# Patient Record
Sex: FEMALE | URBAN - NONMETROPOLITAN AREA
[De-identification: names, ages, dates, MRNs, and addresses within clinical notes are randomized per-mention and may not be internally consistent; named-entity substitution may affect disease eponyms.]

---

## 2023-10-06 ENCOUNTER — DOCTOR'S OFFICE (OUTPATIENT)
Dept: URBAN - NONMETROPOLITAN AREA CLINIC 1 | Facility: CLINIC | Age: 88
Setting detail: OPHTHALMOLOGY
End: 2023-10-06
Payer: COMMERCIAL

## 2023-10-06 ENCOUNTER — RX ONLY (RX ONLY)
Age: 88
End: 2023-10-06

## 2023-10-06 DIAGNOSIS — H35.3132: ICD-10-CM

## 2023-10-06 DIAGNOSIS — H53.16: ICD-10-CM

## 2023-10-06 DIAGNOSIS — H26.493: ICD-10-CM

## 2023-10-06 DIAGNOSIS — H18.793: ICD-10-CM

## 2023-10-06 PROBLEM — Z96.1: Status: ACTIVE | Noted: 2023-10-06

## 2023-10-06 PROBLEM — H26.492 PCO; RIGHT EYE, LEFT EYE: Status: ACTIVE | Noted: 2023-10-06

## 2023-10-06 PROBLEM — H26.491 PCO; RIGHT EYE, LEFT EYE: Status: ACTIVE | Noted: 2023-10-06

## 2023-10-06 PROBLEM — H43.813 POSTERIOR VITREOUS DETACHMENT; BOTH EYES: Status: ACTIVE | Noted: 2023-10-06

## 2023-10-06 PROCEDURE — 92004 COMPRE OPH EXAM NEW PT 1/>: CPT | Performed by: OPHTHALMOLOGY

## 2023-10-06 PROCEDURE — 92134 CPTRZ OPH DX IMG PST SGM RTA: CPT | Performed by: OPHTHALMOLOGY

## 2023-10-06 ASSESSMENT — SPHEQUIV_DERIVED
OD_SPHEQUIV: -0.625
OS_SPHEQUIV: 0.125

## 2023-10-06 ASSESSMENT — KERATOMETRY
OS_K1POWER_DIOPTERS: 45.00
OD_AXISANGLE_DEGREES: 051
OD_K2POWER_DIOPTERS: 45.75
OS_K2POWER_DIOPTERS: 45.50
OD_K1POWER_DIOPTERS: 45.00
OS_AXISANGLE_DEGREES: 073

## 2023-10-06 ASSESSMENT — REFRACTION_AUTOREFRACTION
OD_AXIS: 114
OD_SPHERE: -0.25
OS_AXIS: 130
OD_CYLINDER: -0.75
OS_CYLINDER: -0.75
OS_SPHERE: +0.50

## 2023-10-06 ASSESSMENT — VISUAL ACUITY
OD_BCVA: 20/30-1
OS_BCVA: 20/30-2

## 2023-10-06 ASSESSMENT — AXIALLENGTH_DERIVED
OS_AL: 22.9201
OD_AL: 23.1549

## 2023-10-06 ASSESSMENT — CONFRONTATIONAL VISUAL FIELD TEST (CVF)
OS_FINDINGS: FULL
OD_FINDINGS: FULL

## 2024-09-08 PROBLEM — Z87.440 HISTORY OF RECURRENT UTIS: Status: ACTIVE | Noted: 2024-09-08

## 2024-09-09 ENCOUNTER — OFFICE VISIT (OUTPATIENT)
Dept: UROLOGY | Facility: CLINIC | Age: 89
End: 2024-09-09
Payer: COMMERCIAL

## 2024-09-09 VITALS
SYSTOLIC BLOOD PRESSURE: 140 MMHG | HEART RATE: 66 BPM | OXYGEN SATURATION: 66 % | BODY MASS INDEX: 33.35 KG/M2 | HEIGHT: 63 IN | DIASTOLIC BLOOD PRESSURE: 80 MMHG | TEMPERATURE: 98.4 F | WEIGHT: 188.2 LBS | RESPIRATION RATE: 20 BRPM

## 2024-09-09 DIAGNOSIS — N18.32 STAGE 3B CHRONIC KIDNEY DISEASE (HCC): ICD-10-CM

## 2024-09-09 DIAGNOSIS — Z87.440 HISTORY OF RECURRENT UTIS: ICD-10-CM

## 2024-09-09 DIAGNOSIS — N39.41 URGE INCONTINENCE OF URINE: ICD-10-CM

## 2024-09-09 DIAGNOSIS — R35.1 NOCTURIA: Primary | ICD-10-CM

## 2024-09-09 LAB
POST-VOID RESIDUAL VOLUME, ML POC: 42 ML
SL AMB  POCT GLUCOSE, UA: NORMAL
SL AMB LEUKOCYTE ESTERASE,UA: NORMAL
SL AMB POCT BILIRUBIN,UA: NORMAL
SL AMB POCT BLOOD,UA: NORMAL
SL AMB POCT CLARITY,UA: CLEAR
SL AMB POCT COLOR,UA: YELLOW
SL AMB POCT KETONES,UA: NORMAL
SL AMB POCT NITRITE,UA: NORMAL
SL AMB POCT PH,UA: 6
SL AMB POCT SPECIFIC GRAVITY,UA: 1.01
SL AMB POCT URINE PROTEIN: NORMAL
SL AMB POCT UROBILINOGEN: 0.2

## 2024-09-09 PROCEDURE — 81003 URINALYSIS AUTO W/O SCOPE: CPT | Performed by: UROLOGY

## 2024-09-09 PROCEDURE — 51798 US URINE CAPACITY MEASURE: CPT | Performed by: UROLOGY

## 2024-09-09 PROCEDURE — 99204 OFFICE O/P NEW MOD 45 MIN: CPT | Performed by: UROLOGY

## 2024-09-09 PROCEDURE — 87086 URINE CULTURE/COLONY COUNT: CPT | Performed by: UROLOGY

## 2024-09-09 PROCEDURE — 87077 CULTURE AEROBIC IDENTIFY: CPT | Performed by: UROLOGY

## 2024-09-09 RX ORDER — TRIMETHOPRIM 100 MG/1
TABLET ORAL
COMMUNITY
Start: 2024-08-26

## 2024-09-09 RX ORDER — MIRABEGRON 25 MG/1
25 TABLET, FILM COATED, EXTENDED RELEASE ORAL DAILY
COMMUNITY
End: 2024-09-09 | Stop reason: ALTCHOICE

## 2024-09-09 RX ORDER — GABAPENTIN 100 MG/1
300 CAPSULE ORAL DAILY PRN
COMMUNITY

## 2024-09-09 RX ORDER — PANTOPRAZOLE SODIUM 40 MG/1
40 TABLET, DELAYED RELEASE ORAL DAILY
COMMUNITY
Start: 2024-08-12

## 2024-09-09 RX ORDER — HYDROCODONE BITARTRATE AND ACETAMINOPHEN 5; 325 MG/1; MG/1
1 TABLET ORAL 2 TIMES DAILY
COMMUNITY
Start: 2024-08-09

## 2024-09-09 RX ORDER — FUROSEMIDE 20 MG
20 TABLET ORAL 2 TIMES DAILY
COMMUNITY

## 2024-09-09 RX ORDER — DICYCLOMINE HYDROCHLORIDE 10 MG/1
1 CAPSULE ORAL 3 TIMES DAILY PRN
COMMUNITY

## 2024-09-09 RX ORDER — SENNOSIDES 8.6 MG
650 CAPSULE ORAL EVERY 8 HOURS PRN
COMMUNITY

## 2024-09-09 RX ORDER — CLONIDINE HYDROCHLORIDE 0.1 MG/1
0.1 TABLET ORAL 3 TIMES DAILY
COMMUNITY

## 2024-09-09 RX ORDER — AMLODIPINE BESYLATE 10 MG/1
10 TABLET ORAL DAILY
COMMUNITY
Start: 2023-11-27 | End: 2024-11-26

## 2024-09-09 RX ORDER — OXYBUTYNIN CHLORIDE 5 MG/1
5 TABLET ORAL DAILY
COMMUNITY

## 2024-09-09 RX ORDER — TRIAMCINOLONE ACETONIDE 1 MG/G
CREAM TOPICAL 2 TIMES DAILY
COMMUNITY

## 2024-09-09 RX ORDER — MECLIZINE HCL 25MG 25 MG/1
1 TABLET, CHEWABLE ORAL 3 TIMES DAILY PRN
COMMUNITY

## 2024-09-09 RX ORDER — SODIUM CHLORIDE 50 MG/ML
SOLUTION OPHTHALMIC
COMMUNITY
Start: 2024-06-24

## 2024-09-09 RX ORDER — OMEPRAZOLE 40 MG/1
40 CAPSULE, DELAYED RELEASE ORAL DAILY
COMMUNITY
End: 2024-09-09 | Stop reason: ALTCHOICE

## 2024-09-09 RX ORDER — KETOCONAZOLE 20 MG/G
CREAM TOPICAL
COMMUNITY
Start: 2024-09-04

## 2024-09-09 RX ORDER — ATENOLOL 50 MG/1
50 TABLET ORAL DAILY
COMMUNITY

## 2024-09-09 RX ORDER — ALBUTEROL SULFATE 90 UG/1
2 AEROSOL, METERED RESPIRATORY (INHALATION) EVERY 6 HOURS PRN
COMMUNITY

## 2024-09-09 RX ORDER — POTASSIUM CHLORIDE 1.5 G/1.58G
20 POWDER, FOR SOLUTION ORAL 2 TIMES DAILY
COMMUNITY

## 2024-09-09 RX ORDER — NYSTATIN 100000 U/G
CREAM TOPICAL AS NEEDED
COMMUNITY

## 2024-09-09 RX ORDER — AMMONIUM LACTATE 12 G/100G
CREAM TOPICAL
COMMUNITY

## 2024-09-09 RX ORDER — SUCRALFATE 1 G/1
1 TABLET ORAL 4 TIMES DAILY
COMMUNITY
End: 2024-09-09

## 2024-09-09 RX ORDER — LOSARTAN POTASSIUM 50 MG/1
50 TABLET ORAL DAILY
COMMUNITY
Start: 2024-08-12

## 2024-09-09 RX ORDER — METHENAMINE HIPPURATE 1000 MG/1
1 TABLET ORAL 2 TIMES DAILY WITH MEALS
Qty: 60 TABLET | Refills: 3 | Status: SHIPPED | OUTPATIENT
Start: 2024-09-09

## 2024-09-09 NOTE — PROGRESS NOTES
UROLOGY PROGRESS NOTE         NAME: April Wilkes  AGE: 88 y.o. SEX: female  : 1935   MRN: [id number]    DATE: 2024  TIME: 9:46 AM    Assessment and Plan      Impression:   1. Stage 3b chronic kidney disease (HCC)  2. History of recurrent UTIs  -     POCT urine dip auto non-scope  -     US kidney and bladder with pvr; Future; Expected date: 2024  -     methenamine hippurate (HIPREX) 1 g tablet; Take 1 tablet (1 g total) by mouth 2 (two) times a day with meals  -     Creatinine, serum; Future  3. Urge incontinence of urine  4. Nocturia  -     Urine culture; Future  -     Urine culture       Plan: Patient's been experiencing bothersome recurrent UTIs greater than 2 every 6 months.  We sent her urine for culture today her postvoid residual is normal.  I requested a renal and bladder sonogram as well as flexible cystoscopy.  She believes she had a cystoscopy with Dr. Cruz about 10 years ago.  No gross hematuria.    I am placing her on methenamine 1 g twice daily along with vitamin C 500 mg twice daily.  I spoke with she and her daughter regarding this.  She understands that this is for UTI prevention.  Will see her back with the testing.  Will contact her if the culture indicates anything else needed.      Chief Complaint     Chief Complaint   Patient presents with   • New Patient Visit     Patient here for a new patient appointment for frequent UTI's and frequent urination.      History of Present Illness     HPI: April Wilkes is a 88 y.o. year old female who presents with recurrent UTIs and urinary incontinence along with frequency..              The following portions of the patient's history were reviewed and updated as appropriate: allergies, current medications, past family history, past medical history, past social history, past surgical history and problem list.  Past Medical History:   Diagnosis Date   • Asthma    • DJD (degenerative joint disease)    • GERD (gastroesophageal reflux  "disease)    • Hypertension    • Pulmonary nodules    • Seborrheic keratoses    • Skin cancer    • Transient cerebral ischemia    • Vertigo      Past Surgical History:   Procedure Laterality Date   • BACK SURGERY     • CHOLECYSTECTOMY     • EYE SURGERY Bilateral     cataracts   • JOINT REPLACEMENT Bilateral     knee   • LUMBAR SPINE SURGERY     • ORIF ANKLE FRACTURE     • TOTAL KNEE ARTHROPLASTY Right      shoulder  Review of Systems     Const: Denies chills, fever and weight loss.  CV: Denies chest pain.  Resp: Denies SOB.  GI: Denies abdominal pain, nausea and vomiting.  : Denies symptoms other than stated above.  Musculo: Denies back pain.    Objective   /80   Pulse 66   Temp 98.4 °F (36.9 °C) (Tympanic)   Resp 20   Ht 5' 3\" (1.6 m)   Wt 85.4 kg (188 lb 3.2 oz)   SpO2 (!) 66%   BMI 33.34 kg/m²     Physical Exam  Const: Appears healthy and well developed. No signs of acute distress present.  Resp: Respirations are regular and unlabored.   CV: Rate is regular. Rhythm is regular.  Abdomen: Abdomen is soft, nontender, and nondistended. Kidneys are not palpable.  : Not performed  Psych: Patient's attitude is cooperative. Mood is normal. Affect is normal.    Procedure   Procedures     Current Medications     Current Outpatient Medications:   •  acetaminophen (TYLENOL) 650 mg CR tablet, Take 650 mg by mouth every 8 (eight) hours as needed, Disp: , Rfl:   •  albuterol (PROVENTIL HFA,VENTOLIN HFA) 90 mcg/act inhaler, Inhale 2 puffs every 6 (six) hours as needed, Disp: , Rfl:   •  amLODIPine (NORVASC) 10 mg tablet, Take 10 mg by mouth daily, Disp: , Rfl:   •  ammonium lactate (LAC-HYDRIN) 12 % cream, Apply topically, Disp: , Rfl:   •  atenolol (TENORMIN) 50 mg tablet, Take 50 mg by mouth daily, Disp: , Rfl:   •  Cholecalciferol 125 MCG/ML LIQD, Take 2,000 Units by mouth, Disp: , Rfl:   •  cloNIDine (CATAPRES) 0.1 mg tablet, Take 0.1 mg by mouth Three times a day, Disp: , Rfl:   •  D-Mannose 500 MG CAPS, " Take 3 tablets by mouth daily, Disp: , Rfl:   •  dicyclomine (BENTYL) 10 mg capsule, Take 1 capsule by mouth 3 (three) times a day as needed, Disp: , Rfl:   •  fluticasone (VERAMYST) 27.5 MCG/SPRAY nasal spray, 2 sprays into each nostril daily, Disp: , Rfl:   •  furosemide (LASIX) 20 mg tablet, Take 20 mg by mouth 2 (two) times a day, Disp: , Rfl:   •  gabapentin (NEURONTIN) 100 mg capsule, Take 300 mg by mouth daily as needed, Disp: , Rfl:   •  HYDROcodone-acetaminophen (NORCO) 5-325 mg per tablet, Take 1 tablet by mouth 2 (two) times a day, Disp: , Rfl:   •  ketoconazole (NIZORAL) 2 % cream, APPLY TO AFFECTED FOLDS OF SKIN TWICE A DAY FOR FLARES..MAY MIX W...  (REFER TO PRESCRIPTION NOTES)., Disp: , Rfl:   •  losartan (COZAAR) 50 mg tablet, Take 50 mg by mouth daily, Disp: , Rfl:   •  Meclizine HCl 25 MG CHEW, Chew 1 tablet 3 (three) times a day as needed, Disp: , Rfl:   •  methenamine hippurate (HIPREX) 1 g tablet, Take 1 tablet (1 g total) by mouth 2 (two) times a day with meals, Disp: 60 tablet, Rfl: 3  •  Michelle 128 5 % hypertonic ophthalmic solution, INSTILL 1 DROP INTO BOTH EYES FOUR TIMES A DAY, Disp: , Rfl:   •  nystatin (MYCOSTATIN) cream, Apply topically as needed, Disp: , Rfl:   •  oxybutynin (DITROPAN) 5 mg tablet, Take 5 mg by mouth daily, Disp: , Rfl:   •  pantoprazole (PROTONIX) 40 mg tablet, Take 40 mg by mouth daily, Disp: , Rfl:   •  potassium chloride (KLOR-CON) 20 mEq packet, Take 20 mEq by mouth 2 (two) times a day, Disp: , Rfl:   •  triamcinolone (KENALOG) 0.1 % cream, Apply topically 2 (two) times a day, Disp: , Rfl:   •  trimethoprim (PROLOPRIM) 100 mg tablet, One daily, Disp: , Rfl:         Melanie Johnson MD

## 2024-09-15 LAB — BACTERIA UR CULT: ABNORMAL

## 2024-09-16 DIAGNOSIS — Z87.440 HISTORY OF RECURRENT UTIS: Primary | ICD-10-CM

## 2024-09-16 RX ORDER — CEFUROXIME AXETIL 500 MG/1
500 TABLET ORAL EVERY 12 HOURS SCHEDULED
Qty: 14 TABLET | Refills: 0 | Status: SHIPPED | OUTPATIENT
Start: 2024-09-16 | End: 2024-09-23

## 2024-09-17 ENCOUNTER — TELEPHONE (OUTPATIENT)
Dept: UROLOGY | Facility: CLINIC | Age: 89
End: 2024-09-17

## 2024-09-17 NOTE — RESULT ENCOUNTER NOTE
Ceftin E scripted to patient's pharmacy.  Unfortunately no sensitivities.  She should hold the Hip-Wilmar while taking Ceftin and then restart the Hip-Wilmar.

## 2024-09-17 NOTE — TELEPHONE ENCOUNTER
Patient calling to report that the methenamine was not helping her and she was still experiencing frequency and urgency. She requested for an antibiotic to be sent in. Reviewed chart and made patient aware that an antibiotic was sent to her pharmacy last night. She is going to follow up with the pharmacy to confirm they received it and will stop the Hiprex per doctors instructions while taking the antibiotic.

## 2024-09-17 NOTE — TELEPHONE ENCOUNTER
----- Message from Nato Johnson MD sent at 9/16/2024 10:02 PM EDT -----  Ceftin E scripted to patient's pharmacy.  Unfortunately no sensitivities.  She should hold the Hip-Wilmar while taking Ceftin and then restart the Hip-Wilmar.

## 2024-09-26 ENCOUNTER — TELEPHONE (OUTPATIENT)
Age: 89
End: 2024-09-26

## 2024-09-26 NOTE — TELEPHONE ENCOUNTER
Patient called to confirm procedure prep for ultrasound  testing scheduled for Monday. Patient also questioned if its ok to continue Hiprex and oxybutynin as prescribed prior to testing.    Advised patient to continue medications and reviewed Ultrasound prep

## 2024-09-27 ENCOUNTER — TELEPHONE (OUTPATIENT)
Dept: UROLOGY | Facility: CLINIC | Age: 89
End: 2024-09-27

## 2024-09-27 DIAGNOSIS — N39.41 URGE INCONTINENCE OF URINE: Primary | ICD-10-CM

## 2024-09-27 DIAGNOSIS — Z87.440 HISTORY OF RECURRENT UTIS: ICD-10-CM

## 2024-09-27 NOTE — TELEPHONE ENCOUNTER
Spoke with pt, she finished the ceftin on Sunday 9/22/24, and she started leaking again yesterday, and has burning.  Is wheel chair bound. Feels the same symptoms as last infection. She is asking to have more Cefitn prescribed.  Pharmacy is [facility] in [place].

## 2024-09-27 NOTE — TELEPHONE ENCOUNTER
According to the chart, Dr. Johnson did call in Ceftin for her on 9/16.  She was to resume methanamine 1 gm two times daily along with vitamin c 500mg twice daily.  Has she resumed this medication to help avoid further UTI

## 2024-09-27 NOTE — TELEPHONE ENCOUNTER
Spoke with patient and she said that she is taking the Methanamine 1 gm along with Vitamin C 500 mg twice a day. She said it is not helping. She is having a lot of burning and pressure. She said that she is hydrating and feels that she needs an antibiotic.

## 2024-09-27 NOTE — TELEPHONE ENCOUNTER
Please let her know she can try over the counter AZO.  Two tablets is equal to the prescription dose.   Hope this can provide relief.  We will watch for her urine results

## 2024-09-27 NOTE — TELEPHONE ENCOUNTER
Patient called the RX Refill Line. Message is being forwarded to the office.     Patient is requesting cefuroxime (CEFTIN) 500 mg tablet, states that she cannot hold her urine at all. If this goes to the pharmacy early enough they will deliver it for her. She is having a hard time being able to hold her urine in at this time. She has a test coming up as well and without the medicine she doesn't think she will be able to hold her urine.     Please contact patient at [phone] with an update once this is taken care of

## 2024-09-27 NOTE — TELEPHONE ENCOUNTER
No recent urinalysis and culture on file.  Last one noted was September 9, 2024.  Please offer repeat testing if she is experiencing the symptoms

## 2024-09-27 NOTE — TELEPHONE ENCOUNTER
Placed orders for patient to obtain urine. She is unsure if she will be able to get to the lab today. She might be able to get there tomorrow or Monday. She is W/C bound and has to count on her daughter for transportation. She wanted to know if something could be called in until she is able to get the culture back. She is doing everything and still having symptoms. She is also using a heating pad and encouraging fluids.

## 2024-09-28 ENCOUNTER — APPOINTMENT (OUTPATIENT)
Dept: LAB | Facility: HOSPITAL | Age: 89
End: 2024-09-28
Payer: COMMERCIAL

## 2024-09-28 DIAGNOSIS — Z87.440 HISTORY OF RECURRENT UTIS: Primary | ICD-10-CM

## 2024-09-28 DIAGNOSIS — R39.9 UTI SYMPTOMS: ICD-10-CM

## 2024-09-28 DIAGNOSIS — N39.41 URGE INCONTINENCE OF URINE: ICD-10-CM

## 2024-09-28 LAB
BACTERIA UR QL AUTO: ABNORMAL /HPF
BILIRUB UR QL STRIP: NEGATIVE
CLARITY UR: CLEAR
COLOR UR: ABNORMAL
GLUCOSE UR STRIP-MCNC: NEGATIVE MG/DL
HGB UR QL STRIP.AUTO: NEGATIVE
KETONES UR STRIP-MCNC: NEGATIVE MG/DL
LEUKOCYTE ESTERASE UR QL STRIP: ABNORMAL
NITRITE UR QL STRIP: POSITIVE
NON-SQ EPI CELLS URNS QL MICRO: ABNORMAL /HPF
PH UR STRIP.AUTO: 6 [PH]
PROT UR STRIP-MCNC: ABNORMAL MG/DL
RBC #/AREA URNS AUTO: ABNORMAL /HPF
SP GR UR STRIP.AUTO: 1.02 (ref 1–1.03)
UROBILINOGEN UR QL STRIP.AUTO: 1 E.U./DL
WBC #/AREA URNS AUTO: ABNORMAL /HPF

## 2024-09-28 PROCEDURE — 87077 CULTURE AEROBIC IDENTIFY: CPT

## 2024-09-28 PROCEDURE — 87086 URINE CULTURE/COLONY COUNT: CPT

## 2024-09-28 PROCEDURE — 87186 SC STD MICRODIL/AGAR DIL: CPT

## 2024-09-28 PROCEDURE — 81001 URINALYSIS AUTO W/SCOPE: CPT

## 2024-09-30 ENCOUNTER — HOSPITAL ENCOUNTER (OUTPATIENT)
Dept: ULTRASOUND IMAGING | Facility: HOSPITAL | Age: 89
Discharge: HOME | End: 2024-09-30
Attending: UROLOGY
Payer: COMMERCIAL

## 2024-09-30 ENCOUNTER — TELEPHONE (OUTPATIENT)
Dept: UROLOGY | Facility: CLINIC | Age: 89
End: 2024-09-30

## 2024-09-30 DIAGNOSIS — Z87.440 HISTORY OF RECURRENT UTIS: ICD-10-CM

## 2024-09-30 LAB
BACTERIA UR CULT: ABNORMAL
BACTERIA UR CULT: ABNORMAL

## 2024-09-30 PROCEDURE — 76770 US EXAM ABDO BACK WALL COMP: CPT

## 2024-09-30 RX ORDER — CEFUROXIME AXETIL 500 MG/1
500 TABLET ORAL EVERY 12 HOURS SCHEDULED
Qty: 14 TABLET | Refills: 0 | Status: SHIPPED | OUTPATIENT
Start: 2024-09-30 | End: 2024-10-07

## 2024-09-30 NOTE — TELEPHONE ENCOUNTER
----- Message from MARISEL Hernandez sent at 9/30/2024 11:21 AM EDT -----  Ceftin sent to pharmacy on file

## 2024-10-21 ENCOUNTER — PROCEDURE VISIT (OUTPATIENT)
Dept: UROLOGY | Facility: CLINIC | Age: 89
End: 2024-10-21
Payer: COMMERCIAL

## 2024-10-21 ENCOUNTER — TELEPHONE (OUTPATIENT)
Dept: UROLOGY | Facility: CLINIC | Age: 89
End: 2024-10-21

## 2024-10-21 VITALS
WEIGHT: 180 LBS | SYSTOLIC BLOOD PRESSURE: 144 MMHG | HEIGHT: 63 IN | RESPIRATION RATE: 18 BRPM | HEART RATE: 62 BPM | DIASTOLIC BLOOD PRESSURE: 82 MMHG | OXYGEN SATURATION: 96 % | BODY MASS INDEX: 31.89 KG/M2 | TEMPERATURE: 98.2 F

## 2024-10-21 DIAGNOSIS — R35.1 NOCTURIA: Primary | ICD-10-CM

## 2024-10-21 DIAGNOSIS — Z87.440 HISTORY OF RECURRENT UTIS: ICD-10-CM

## 2024-10-21 LAB
SL AMB  POCT GLUCOSE, UA: ABNORMAL
SL AMB LEUKOCYTE ESTERASE,UA: ABNORMAL
SL AMB POCT BILIRUBIN,UA: ABNORMAL
SL AMB POCT BLOOD,UA: ABNORMAL
SL AMB POCT CLARITY,UA: CLEAR
SL AMB POCT COLOR,UA: YELLOW
SL AMB POCT KETONES,UA: ABNORMAL
SL AMB POCT NITRITE,UA: POSITIVE
SL AMB POCT PH,UA: 6
SL AMB POCT SPECIFIC GRAVITY,UA: 1.01
SL AMB POCT URINE PROTEIN: ABNORMAL
SL AMB POCT UROBILINOGEN: 0.2

## 2024-10-21 PROCEDURE — 87086 URINE CULTURE/COLONY COUNT: CPT | Performed by: UROLOGY

## 2024-10-21 PROCEDURE — 81003 URINALYSIS AUTO W/O SCOPE: CPT | Performed by: UROLOGY

## 2024-10-21 PROCEDURE — 52000 CYSTOURETHROSCOPY: CPT | Performed by: UROLOGY

## 2024-10-21 PROCEDURE — 87077 CULTURE AEROBIC IDENTIFY: CPT | Performed by: UROLOGY

## 2024-10-21 PROCEDURE — 87186 SC STD MICRODIL/AGAR DIL: CPT | Performed by: UROLOGY

## 2024-10-21 RX ORDER — CIPROFLOXACIN 250 MG/1
250 TABLET, FILM COATED ORAL EVERY 12 HOURS SCHEDULED
Qty: 14 TABLET | Refills: 0 | Status: SHIPPED | OUTPATIENT
Start: 2024-10-21 | End: 2024-10-28

## 2024-10-21 NOTE — PROGRESS NOTES
Cystoscopy     Date/Time  10/21/2024 9:30 AM     Performed by  Nato Johnson MD   Authorized by  Nato Johnson MD     Universal Protocol:  Consent: Verbal consent obtained. Written consent obtained.  Risks and benefits: risks, benefits and alternatives were discussed  Consent given by: patient  Patient understanding: patient states understanding of the procedure being performed  Patient consent: the patient's understanding of the procedure matches consent given  Procedure consent: procedure consent matches procedure scheduled  Patient identity confirmed: verbally with patient      Procedure Details:  Procedure type: cystoscopy    Patient tolerance: Patient tolerated the procedure well with no immediate complications    Additional Procedure Details: Patient with history of recurrent UTIs.  Most recently treated with Ceftin.  Was also placed on methenamine for UTI prevention.  Had ultrasound completed which did not reveal any abnormality.  Here for cystoscopy.    Had a positive urine culture about a month or so ago with Pseudomonas.  Unclear if this was treated.  Was sensitive to Cipro.  Patient has some minor GI intolerance to the medication.  No stones or abnormalities noted on the ultrasound of kidneys and bladder.    Patient was placed in lithotomy position vagina was prepped and draped in usual sterile fashion using Betadine.  Lidocaine jelly was instilled per urethra flexible cystoscopy then performed.  Urethra was normal.  Upon entering bladder we withdrew urine directly from the bladder for culture.  This was sent.  The bladder was carefully inspected.  There are changes consistent with chronic cystitis of a mild nature.  There is a small amount of debris within the base of the bladder.  This was irrigated out.  Good visualization of the bladder was obtained.  There were no other abnormalities noted.  There were no bladder tumors, stones or diverticula.  The right and left ureteral orifice are normal in  both effluxed clear urine.  The scope was retroflexed the bladder outlet appears normal.  The scope was then removed.  Patient tolerated the procedure very well there were no immediate problems.    I reviewed findings with she and her family member.  She does not think methenamine helped at all.  She also did not like the taste of the medicine.  She has tried trimethoprim in the past as well without much improvement.    Based on the most recent culture I am placing her on Cipro 250 mg p.o. twice daily for 7 days she is going to take this with food and hopefully it will not upset her stomach.  We await the urine culture that we sent today.  I asked her to restart vaginal Premarin cream which she had been using in the distant past.  The Premarin cream should be one half applicator 3 times a week.  She is familiar with using this.  She is unsure if it helped in the past.  We are somewhat limited in other alternatives regarding UTI prevention given her intolerances to certain medications.    Will see her back in 1 year

## 2024-10-23 LAB
BACTERIA UR CULT: ABNORMAL
BACTERIA UR CULT: ABNORMAL